# Patient Record
Sex: MALE | Race: OTHER | NOT HISPANIC OR LATINO | URBAN - METROPOLITAN AREA
[De-identification: names, ages, dates, MRNs, and addresses within clinical notes are randomized per-mention and may not be internally consistent; named-entity substitution may affect disease eponyms.]

---

## 2018-12-07 ENCOUNTER — EMERGENCY (EMERGENCY)
Facility: HOSPITAL | Age: 63
LOS: 1 days | Discharge: HOME | End: 2018-12-07
Admitting: EMERGENCY MEDICINE
Payer: MEDICAID

## 2018-12-07 VITALS
TEMPERATURE: 98 F | HEART RATE: 76 BPM | DIASTOLIC BLOOD PRESSURE: 83 MMHG | RESPIRATION RATE: 18 BRPM | SYSTOLIC BLOOD PRESSURE: 136 MMHG | OXYGEN SATURATION: 99 %
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
## 2018-12-07 DIAGNOSIS — S61.411A LACERATION WITHOUT FOREIGN BODY OF RIGHT HAND, INITIAL ENCOUNTER: ICD-10-CM
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
## 2018-12-07 DIAGNOSIS — E11.9 TYPE 2 DIABETES MELLITUS WITHOUT COMPLICATIONS: ICD-10-CM

## 2018-12-07 DIAGNOSIS — Z23 ENCOUNTER FOR IMMUNIZATION: ICD-10-CM

## 2018-12-07 PROCEDURE — 99283 EMERGENCY DEPT VISIT LOW MDM: CPT | Mod: 25

## 2018-12-07 PROCEDURE — 12001 RPR S/N/AX/GEN/TRNK 2.5CM/<: CPT

## 2018-12-07 RX ORDER — BACITRACIN ZINC 500 UNIT/G
1 OINTMENT IN PACKET (EA) TOPICAL ONCE
Qty: 0 | Refills: 0 | Status: COMPLETED | OUTPATIENT
Start: 2018-12-07 | End: 2018-12-07

## 2018-12-07 RX ORDER — IBUPROFEN 200 MG
600 TABLET ORAL ONCE
Qty: 0 | Refills: 0 | Status: COMPLETED | OUTPATIENT
Start: 2018-12-07 | End: 2018-12-07

## 2018-12-07 RX ORDER — TETANUS TOXOID, REDUCED DIPHTHERIA TOXOID AND ACELLULAR PERTUSSIS VACCINE, ADSORBED 5; 2.5; 8; 8; 2.5 [IU]/.5ML; [IU]/.5ML; UG/.5ML; UG/.5ML; UG/.5ML
0.5 SUSPENSION INTRAMUSCULAR ONCE
Qty: 0 | Refills: 0 | Status: COMPLETED | OUTPATIENT
Start: 2018-12-07 | End: 2018-12-07

## 2018-12-07 RX ADMIN — Medication 1 APPLICATION(S): at 13:10

## 2018-12-07 RX ADMIN — Medication 600 MILLIGRAM(S): at 13:01

## 2018-12-07 RX ADMIN — TETANUS TOXOID, REDUCED DIPHTHERIA TOXOID AND ACELLULAR PERTUSSIS VACCINE, ADSORBED 0.5 MILLILITER(S): 5; 2.5; 8; 8; 2.5 SUSPENSION INTRAMUSCULAR at 13:01

## 2018-12-07 NOTE — ED PROVIDER NOTE - OBJECTIVE STATEMENT
64 yo M 64 yo M with PMHx of DM, lymphoma, s/p chemo, in remission currently, hep B, RHD, last tetanus unknown, presenting c/o laceration to the R hand region x 1 hr.  Pt was trying to catch a glass bottle in the grocery store, bottle broke and sustained laceration to the R hand region.  Now with pain and active bleeding.  Denies fever, chills, FB sensation, change in ROM/sensation, redness, swelling, paresthesia, purulent d/c, N/V, HA, dizziness, LOC, CP, SOB, and focal weakness

## 2018-12-07 NOTE — ED ADULT NURSE NOTE - CHPI ED NUR SYMPTOMS NEG
no rectal pain/no fever/no redness/no vomiting/no blood in mucus/no chills/no drainage/no pain/no purulent drainage

## 2018-12-07 NOTE — ED PROVIDER NOTE - PHYSICAL EXAMINATION
Gen - WDWN, NAD, comfortable and non-toxic appearing  Skin - warm, dry, 2cm linear laceration to the base of the 5th MCP on the dorsum aspect with mild active bleeding, no bony or tendon exposure, no FB noted  HEENT - AT/NC, airway patent, neck supple   CV - S1S2, R/R/R  Resp - CTAB, no r/r/w  GI - soft, ND, NT, no CVAT b/l   MS - w/w/p, no c/c/e, TTP around the wound site, no FB sensation, NV intact, FROM, +SILT   Neuro - AxOx3, ambulatory without gait disturbance

## 2018-12-07 NOTE — ED PROVIDER NOTE - NSFOLLOWUPINSTRUCTIONS_ED_ALL_ED_FT
NON-DISSOLVABLE SUTURES  * Please note minor swelling, redness, pain, itching, and occasional bleeding (that’s controlled by direct pressure) are common with all wounds and normally will go away as wound heals     • Keep dressing clean and dry for 24 hours   • May gently clean wound with soap and water after 24 hours to avoid crusting – PAT DRY after each wash  • Open wound to air or loosen Band-Aid to allow aeration   • Apply Bacitracin or Neosporin ointment twice daily    • Return in 7  days for suture removal    PLEASE SEEK MEDICAL ATTENTION OR RETURN TO ER IMMEDIATELY IF:  * Swelling, redness, or pain increases and cannot be controlled by prescribed pain medication  * Wound feels warm to touch   * Fever >100.4F  * Wound edges reopen/separate   * Foul smelling discharge from wound   * Uncontrolled bleeding with direct pressure  * Increased numbness/tingling/discoloration of wound site

## 2018-12-07 NOTE — ED PROVIDER NOTE - MEDICAL DECISION MAKING DETAILS
pt with laceration to the _, wound copiously irrigated under high pressure and repaired at bedside, NV intact pre and post lac repair, wound care instructions provided, instructed to return in _days for suture removal. pt with laceration to the R hand, wound copiously irrigated under high pressure and repaired at bedside, NV intact pre and post lac repair, wound care instructions provided, instructed to return in 7 days for suture removal.

## 2018-12-07 NOTE — ED ADULT TRIAGE NOTE - CHIEF COMPLAINT QUOTE
pt here for laceration across top of left hand s/p picking up glass bottle at work needs tetanus shot bleeding controlled

## 2018-12-14 ENCOUNTER — EMERGENCY (EMERGENCY)
Facility: HOSPITAL | Age: 63
LOS: 1 days | Discharge: HOME | End: 2018-12-14
Attending: EMERGENCY MEDICINE | Admitting: EMERGENCY MEDICINE
Payer: MEDICAID

## 2018-12-14 VITALS
TEMPERATURE: 98 F | RESPIRATION RATE: 16 BRPM | SYSTOLIC BLOOD PRESSURE: 125 MMHG | DIASTOLIC BLOOD PRESSURE: 67 MMHG | OXYGEN SATURATION: 98 % | HEART RATE: 68 BPM
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
## 2018-12-14 DIAGNOSIS — Z48.02 ENCOUNTER FOR REMOVAL OF SUTURES: ICD-10-CM
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
## 2018-12-14 DIAGNOSIS — E11.9 TYPE 2 DIABETES MELLITUS WITHOUT COMPLICATIONS: ICD-10-CM
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
## 2018-12-14 DIAGNOSIS — T81.31XA DISRUPTION OF EXTERNAL OPERATION (SURGICAL) WOUND, NOT ELSEWHERE CLASSIFIED, INITIAL ENCOUNTER: ICD-10-CM

## 2018-12-14 PROBLEM — C85.90 NON-HODGKIN LYMPHOMA, UNSPECIFIED, UNSPECIFIED SITE: Chronic | Status: ACTIVE | Noted: 2018-12-07

## 2018-12-14 PROBLEM — B19.10 UNSPECIFIED VIRAL HEPATITIS B WITHOUT HEPATIC COMA: Chronic | Status: ACTIVE | Noted: 2018-12-07

## 2018-12-14 PROCEDURE — 99282 EMERGENCY DEPT VISIT SF MDM: CPT

## 2018-12-14 NOTE — ED ADULT NURSE NOTE - CHPI ED NUR SYMPTOMS NEG
no bleeding/no purulent drainage/no rectal pain/no redness/no bleeding at site/no chills/no drainage/no pain/no fever/no inflammation

## 2018-12-14 NOTE — ED ADULT NURSE NOTE - NSIMPLEMENTINTERV_GEN_ALL_ED
Implemented All Universal Safety Interventions:  [place] to call system. Call bell, personal items and telephone within reach. Instruct patient to call for assistance. Room bathroom lighting operational. Non-slip footwear when patient is off stretcher. Physically safe environment: no spills, clutter or unnecessary equipment. Stretcher in lowest position, wheels locked, appropriate side rails in place.

## 2018-12-19 ENCOUNTER — EMERGENCY (EMERGENCY)
Facility: HOSPITAL | Age: 63
LOS: 1 days | Discharge: HOME | End: 2018-12-19
Admitting: EMERGENCY MEDICINE

## 2018-12-19 VITALS
RESPIRATION RATE: 17 BRPM | SYSTOLIC BLOOD PRESSURE: 135 MMHG | TEMPERATURE: 97 F | OXYGEN SATURATION: 100 % | WEIGHT: 119.93 LBS | HEART RATE: 87 BPM | DIASTOLIC BLOOD PRESSURE: 80 MMHG

## 2018-12-19 DIAGNOSIS — Z48.02 ENCOUNTER FOR REMOVAL OF SUTURES: ICD-10-CM

## 2018-12-19 NOTE — ED ADULT NURSE NOTE - NSIMPLEMENTINTERV_GEN_ALL_ED
Implemented All Universal Safety Interventions:  [place] to call system. Call bell, personal items and telephone within reach. Instruct patient to call for assistance. Room bathroom lighting operational. Non-slip footwear when patient is off stretcher. Physically safe environment: no spills, clutter or unnecessary equipment. Stretcher in lowest position, wheels locked, appropriate side rails in place.

## 2018-12-19 NOTE — ED PROVIDER NOTE - NSFOLLOWUPINSTRUCTIONS_ED_ALL_ED_FT
Return to emergency department for for signs of infection: redness, swelling, pain.  Keep area clean and dry.

## 2018-12-19 NOTE — ED ADULT NURSE NOTE - CHPI ED NUR SYMPTOMS NEG
no bleeding/no bleeding at site/no chills/no purulent drainage/no drainage/no fever/no inflammation/no rectal pain/no redness

## 2018-12-19 NOTE — ED PROVIDER NOTE - MEDICAL DECISION MAKING DETAILS
62 y/o M returns to ED for suture removal.  Remaining 2 sutures removed without complications.  Return precautions advised.

## 2018-12-19 NOTE — ED ADULT TRIAGE NOTE - TEMPERATURE IN FAHRENHEIT (DEGREES F)
Ecchymoses and purpura on LE bilterally, on DAPT  · Continue DAPT  · Advised mindfulness of movements   97

## 2018-12-19 NOTE — ED PROVIDER NOTE - SKIN WOUND TYPE
callused laceration with 2 sutures in place over R MCP, no redness, drainage or swelling, No TTP, FROm of digit, distal sensation intact.

## 2018-12-19 NOTE — ED PROVIDER NOTE - OBJECTIVE STATEMENT
64 y/o M presents to ED for suture removal s/p suture placement 11 days ago.  Pt had 4 sutures placed over R MCP and 2 removed 7 days after repair.  He was advised to return for the remaining 2 sutures today.  Pt denies pain, drainage or redness.

## 2022-10-25 NOTE — ED ADULT NURSE NOTE - CAS TRG GEN SKIN CONDITION
Warm Sarecycline Counseling: Patient advised regarding possible photosensitivity and discoloration of the teeth, skin, lips, tongue and gums.  Patient instructed to avoid sunlight, if possible.  When exposed to sunlight, patients should wear protective clothing, sunglasses, and sunscreen.  The patient was instructed to call the office immediately if the following severe adverse effects occur:  hearing changes, easy bruising/bleeding, severe headache, or vision changes.  The patient verbalized understanding of the proper use and possible adverse effects of sarecycline.  All of the patient's questions and concerns were addressed.

## 2024-03-01 NOTE — ED PROVIDER NOTE - PENDING LAB RAD OPT OUT
Exclude Pending Lab and Radiology orders from printing on the Patient's Discharge Instructions, due to Privacy Concerns.
0

## 2024-12-09 NOTE — ED ADULT NURSE NOTE - OBJECTIVE STATEMENT
Symptomatic, but no longer needs inpatient level of care Pt presents to ED with complaints of laceration to right hand s/p picking up glass bottle at work. Bleeding controlled at site. Symptoms in remission
